# Patient Record
Sex: MALE | Race: WHITE | Employment: FULL TIME | URBAN - NONMETROPOLITAN AREA
[De-identification: names, ages, dates, MRNs, and addresses within clinical notes are randomized per-mention and may not be internally consistent; named-entity substitution may affect disease eponyms.]

---

## 2017-12-18 ENCOUNTER — APPOINTMENT (OUTPATIENT)
Dept: GENERAL RADIOLOGY | Age: 62
DRG: 287 | End: 2017-12-18
Payer: COMMERCIAL

## 2017-12-18 ENCOUNTER — HOSPITAL ENCOUNTER (INPATIENT)
Age: 62
LOS: 1 days | Discharge: HOME | DRG: 287 | End: 2017-12-19
Attending: EMERGENCY MEDICINE | Admitting: INTERNAL MEDICINE
Payer: COMMERCIAL

## 2017-12-18 DIAGNOSIS — Z72.0 TOBACCO ABUSE: ICD-10-CM

## 2017-12-18 DIAGNOSIS — R07.9 CHEST PAIN, UNSPECIFIED TYPE: Primary | ICD-10-CM

## 2017-12-18 PROBLEM — R07.89 INTERMITTENT LEFT-SIDED CHEST PAIN: Status: ACTIVE | Noted: 2017-12-18

## 2017-12-18 PROBLEM — F17.210 DEPENDENCE ON NICOTINE FROM CIGARETTES: Status: ACTIVE | Noted: 2017-12-18

## 2017-12-18 LAB
ALBUMIN SERPL-MCNC: 4.1 G/DL (ref 3.5–5.1)
ALP BLD-CCNC: 78 U/L (ref 38–126)
ALT SERPL-CCNC: 28 U/L (ref 11–66)
ANION GAP SERPL CALCULATED.3IONS-SCNC: 11 MEQ/L (ref 8–16)
AST SERPL-CCNC: 25 U/L (ref 5–40)
BASOPHILS # BLD: 0.4 %
BASOPHILS ABSOLUTE: 0 THOU/MM3 (ref 0–0.1)
BILIRUB SERPL-MCNC: 0.3 MG/DL (ref 0.3–1.2)
BILIRUBIN DIRECT: < 0.2 MG/DL (ref 0–0.3)
BUN BLDV-MCNC: 17 MG/DL (ref 7–22)
CALCIUM SERPL-MCNC: 9.1 MG/DL (ref 8.5–10.5)
CHLORIDE BLD-SCNC: 103 MEQ/L (ref 98–111)
CO2: 27 MEQ/L (ref 23–33)
CREAT SERPL-MCNC: 0.9 MG/DL (ref 0.4–1.2)
EKG ATRIAL RATE: 56 BPM
EKG P AXIS: 46 DEGREES
EKG P-R INTERVAL: 216 MS
EKG Q-T INTERVAL: 390 MS
EKG QRS DURATION: 84 MS
EKG QTC CALCULATION (BAZETT): 376 MS
EKG R AXIS: 80 DEGREES
EKG T AXIS: 79 DEGREES
EKG VENTRICULAR RATE: 56 BPM
EOSINOPHIL # BLD: 1.4 %
EOSINOPHILS ABSOLUTE: 0.1 THOU/MM3 (ref 0–0.4)
GFR SERPL CREATININE-BSD FRML MDRD: 85 ML/MIN/1.73M2
GLUCOSE BLD-MCNC: 67 MG/DL (ref 70–108)
HCT VFR BLD CALC: 47 % (ref 42–52)
HEMOGLOBIN: 15.6 GM/DL (ref 14–18)
LIPASE: 34.1 U/L (ref 5.6–51.3)
LYMPHOCYTES # BLD: 21.6 %
LYMPHOCYTES ABSOLUTE: 1.4 THOU/MM3 (ref 1–4.8)
MCH RBC QN AUTO: 30.1 PG (ref 27–31)
MCHC RBC AUTO-ENTMCNC: 33.3 GM/DL (ref 33–37)
MCV RBC AUTO: 90.3 FL (ref 80–94)
MONOCYTES # BLD: 9.6 %
MONOCYTES ABSOLUTE: 0.6 THOU/MM3 (ref 0.4–1.3)
NUCLEATED RED BLOOD CELLS: 0 /100 WBC
OSMOLALITY CALCULATION: 281.1 MOSMOL/KG (ref 275–300)
PDW BLD-RTO: 14.2 % (ref 11.5–14.5)
PLATELET # BLD: 210 THOU/MM3 (ref 130–400)
PMV BLD AUTO: 7.5 MCM (ref 7.4–10.4)
POTASSIUM SERPL-SCNC: 4.4 MEQ/L (ref 3.5–5.2)
PRO-BNP: 90.1 PG/ML (ref 0–900)
RBC # BLD: 5.2 MILL/MM3 (ref 4.7–6.1)
SEG NEUTROPHILS: 67 %
SEGMENTED NEUTROPHILS ABSOLUTE COUNT: 4.4 THOU/MM3 (ref 1.8–7.7)
SODIUM BLD-SCNC: 141 MEQ/L (ref 135–145)
TOTAL PROTEIN: 6.8 G/DL (ref 6.1–8)
TROPONIN T: < 0.01 NG/ML
WBC # BLD: 6.5 THOU/MM3 (ref 4.8–10.8)

## 2017-12-18 PROCEDURE — 80076 HEPATIC FUNCTION PANEL: CPT

## 2017-12-18 PROCEDURE — 99219 PR INITIAL OBSERVATION CARE/DAY 50 MINUTES: CPT | Performed by: INTERNAL MEDICINE

## 2017-12-18 PROCEDURE — 93005 ELECTROCARDIOGRAM TRACING: CPT

## 2017-12-18 PROCEDURE — G0378 HOSPITAL OBSERVATION PER HR: HCPCS

## 2017-12-18 PROCEDURE — 6370000000 HC RX 637 (ALT 250 FOR IP): Performed by: EMERGENCY MEDICINE

## 2017-12-18 PROCEDURE — 2580000003 HC RX 258: Performed by: INTERNAL MEDICINE

## 2017-12-18 PROCEDURE — 99285 EMERGENCY DEPT VISIT HI MDM: CPT

## 2017-12-18 PROCEDURE — 85025 COMPLETE CBC W/AUTO DIFF WBC: CPT

## 2017-12-18 PROCEDURE — 36415 COLL VENOUS BLD VENIPUNCTURE: CPT

## 2017-12-18 PROCEDURE — 84484 ASSAY OF TROPONIN QUANT: CPT

## 2017-12-18 PROCEDURE — 1200000003 HC TELEMETRY R&B

## 2017-12-18 PROCEDURE — 83880 ASSAY OF NATRIURETIC PEPTIDE: CPT

## 2017-12-18 PROCEDURE — 80048 BASIC METABOLIC PNL TOTAL CA: CPT

## 2017-12-18 PROCEDURE — 83690 ASSAY OF LIPASE: CPT

## 2017-12-18 PROCEDURE — 71020 XR CHEST STANDARD TWO VW: CPT

## 2017-12-18 RX ORDER — ONDANSETRON 2 MG/ML
4 INJECTION INTRAMUSCULAR; INTRAVENOUS EVERY 6 HOURS PRN
Status: DISCONTINUED | OUTPATIENT
Start: 2017-12-18 | End: 2017-12-19

## 2017-12-18 RX ORDER — ASPIRIN 81 MG/1
324 TABLET, CHEWABLE ORAL ONCE
Status: COMPLETED | OUTPATIENT
Start: 2017-12-18 | End: 2017-12-18

## 2017-12-18 RX ORDER — SODIUM CHLORIDE 0.9 % (FLUSH) 0.9 %
10 SYRINGE (ML) INJECTION EVERY 12 HOURS SCHEDULED
Status: DISCONTINUED | OUTPATIENT
Start: 2017-12-18 | End: 2017-12-19

## 2017-12-18 RX ORDER — SODIUM CHLORIDE 0.9 % (FLUSH) 0.9 %
10 SYRINGE (ML) INJECTION PRN
Status: DISCONTINUED | OUTPATIENT
Start: 2017-12-18 | End: 2017-12-19

## 2017-12-18 RX ADMIN — ASPIRIN 81 MG 324 MG: 81 TABLET ORAL at 10:10

## 2017-12-18 RX ADMIN — Medication 10 ML: at 20:34

## 2017-12-18 RX ADMIN — NITROGLYCERIN 0.5 INCH: 20 OINTMENT TOPICAL at 10:10

## 2017-12-18 ASSESSMENT — ENCOUNTER SYMPTOMS
NAUSEA: 0
DIARRHEA: 0
EYE ITCHING: 0
EYE DISCHARGE: 0
RHINORRHEA: 0
WHEEZING: 0
ABDOMINAL PAIN: 0
SHORTNESS OF BREATH: 0
ABDOMINAL DISTENTION: 0
VOMITING: 0
COUGH: 0

## 2017-12-18 ASSESSMENT — PAIN DESCRIPTION - PROGRESSION: CLINICAL_PROGRESSION: NOT CHANGED

## 2017-12-18 ASSESSMENT — PAIN DESCRIPTION - PAIN TYPE: TYPE: ACUTE PAIN

## 2017-12-18 ASSESSMENT — PAIN DESCRIPTION - ORIENTATION: ORIENTATION: RIGHT

## 2017-12-18 ASSESSMENT — PAIN DESCRIPTION - LOCATION: LOCATION: CHEST

## 2017-12-18 ASSESSMENT — PAIN SCALES - GENERAL
PAINLEVEL_OUTOF10: 0
PAINLEVEL_OUTOF10: 1
PAINLEVEL_OUTOF10: 0

## 2017-12-18 ASSESSMENT — PAIN DESCRIPTION - DESCRIPTORS: DESCRIPTORS: DISCOMFORT;ACHING

## 2017-12-18 ASSESSMENT — PAIN DESCRIPTION - FREQUENCY: FREQUENCY: INTERMITTENT

## 2017-12-18 NOTE — CONSULTS
[facility]'s  Cardiology Consult      Patient:  Chavo Guerra  YOB: 1955    MRN: [id number]   Acct: [de-identified]     Primary Care Physician: Mitzi Fothergill, CNP        REASON FOR CONSULT: CP    CHIEF COMPLAINT:        HISTORY OF PRESENT ILLNESS:      All labs, EKG's, diagnostic testing and images as well as cardiac cath, stress testing   were reviewed during this encounter  Younger brother had stress test and CABG. 40 pk-yr smoking    PREVIOUS CARDIAC TESTING    Ekg:     Echo:    Str. Test:    Cath:      Past Medical History:    Past Medical History:   Diagnosis Date    C. difficile colitis        Past Surgical History:    Past Surgical History:   Procedure Laterality Date    COLONOSCOPY  2011, 2016    ELBOW SURGERY      TONSILLECTOMY         Medications Prior to Admission:    No prescriptions prior to admission. Allergies:    Review of patient's allergies indicates no known allergies. Social History:    reports that he has been smoking. He has a 21.50 pack-year smoking history. He has never used smokeless tobacco. He reports that he does not drink alcohol or use drugs. Family History:   family history is not on file. REVIEW OF SYSTEMS:    Constitutional: negative for anorexia, chills and fevers,weight change  Respiratory: negative for cough, dyspnea on exertion, hemoptysis, shortness of breath and wheezing  Cardiovascular: negative for chest pain, orthopnea, palpitations and syncope  Gastrointestinal: negative for abdominal pain,nausea , vomiting, constipation, diarrhea.   Hematologic/lymphatic: negative for bruising,prolonged bleeding,blood clots  Musculoskeletal:negative for muscle weakness, myalgias,wasting  Neurological: negative for coordination problems, dizziness, gait problems and vertigo  Behavioral/Psych:negative for mood/sleep disturbance      PHYSICAL EXAM:  Vitals:Patient Vitals for the past 24 hrs:   BP Temp Temp src Pulse Resp SpO2 12/18/17 1305 133/71 97.5 °F (36.4 °C) Oral 55 20 96 %   12/18/17 1246 128/89 - - 50 14 96 %   12/18/17 1121 133/72 - - 52 16 100 %   12/18/17 1009 (!) 153/76 - - 54 17 95 %   12/18/17 0920 134/67 98.2 °F (36.8 °C) Oral 57 16 98 %       Last 3 weights: Wt Readings from Last 3 Encounters:   04/25/16 185 lb 6.4 oz (84.1 kg)   03/08/16 190 lb (86.2 kg)   02/16/16 183 lb (83 kg)     24 hour intake/output:No intake or output data in the 24 hours ending 12/18/17 1326  BMI:There is no height or weight on file to calculate BMI. General Appearance: alert and oriented to person, place and time, well developed and well- nourished, in no acute distress  Skin: warm and dry, no rash or erythema  Eyes: pupils equal, round, and reactive to light, extraocular eye movements intact, conjunctivae normal  Neck: supple and non-tender without mass, no thyromegaly or thyroid nodules, no cervical lymphadenopathy  Pulmonary/Chest: clear to auscultation bilaterally- no wheezes, rales or rhonchi, normal air movement, no respiratory distress  Cardiovascular: normal rate, regular rhythm, normal S1 and S2, no murmurs, rubs, clicks, or gallops, distal pulses intact, no carotid bruits, Negative JVD  Radial Pulses: intact 2+  Abdomen: soft, non-tender, non-distended, normal bowel sounds, no masses or organomegaly  Extremities: no cyanosis, clubbing .  Edema  Musculoskeletal: normal range of motion, no joint swelling, deformity or tenderness    LABS:  Recent Labs      12/18/17   0925   TROPONINT  < 0.010     CBC: Lab Results   Component Value Date    WBC 6.5 12/18/2017    RBC 5.20 12/18/2017    HGB 15.6 12/18/2017    HCT 47.0 12/18/2017    MCV 90.3 12/18/2017    MCH 30.1 12/18/2017    MCHC 33.3 12/18/2017    RDW 14.2 12/18/2017     12/18/2017    MPV 7.5 12/18/2017     BMP:  Lab Results   Component Value Date     12/18/2017    K 4.4 12/18/2017     12/18/2017    CO2 27 12/18/2017    BUN 17 12/18/2017    LABALBU 4.1 12/18/2017 CREATININE 0.9 12/18/2017    CALCIUM 9.1 12/18/2017    LABGLOM 85 12/18/2017    GLUCOSE 67 12/18/2017     Hepatic Function Panel:  Lab Results   Component Value Date    ALKPHOS 78 12/18/2017    ALT 28 12/18/2017    AST 25 12/18/2017    PROT 6.8 12/18/2017    BILITOT 0.3 12/18/2017    BILIDIR <0.2 12/18/2017    LABALBU 4.1 12/18/2017         ASSESSMENT/PLAN:  CP resolved . Has RF and family history. Discussed cath ve stress test and SMOKING CESSATION. Patient will discuss testing with family. NPO after MN.       Clay Corbett MD FACMONTANA,FASE,FSVM,FSCAI,ISRAEL,GIANNI,FACP.  1:26 PM  12/18/2017

## 2017-12-18 NOTE — H&P
History & Physical        Patient:  Dianna Walters  YOB: 1955    MRN: [id number]     Acct: [de-identified]    PCP: Isatu Cabezas CNP    Date of Admission: 12/18/2017    Date of Service: Pt seen/examined on 12/18/17 and Admitted to Inpatient with expected LOS greater than two midnights due to medical therapy. Chief Complaint:  Recurrent chest pain. History Of Present Illness:      59 y.o. male who was brought to the emergency department by his wife with the three-day complaint of recurrent chest pain. The pains began last Saturday and patient describes a tightness of varying intensity focal mostly to the lateral aspect of his right pectoral muscles. Patient went to work this morning but had to quit and return home because of the pain. His wife subsequently made him come to the emergency department. This patient is a heavy smoker with a 40-50 pack year history. He underwent stress testing some 25 years ago and was told all was normal.  He also has a very strong paternal family history of coronary artery disease, however. In the emergency department all of the patient's labs, and initial troponin was negative. The chest pain had declined to 1/10 while in the emergency department after having received aspirin and Nitropaste. Patient was admitted to rule out acute coronary syndrome, but also to receive a full evaluation by cardiology. Past Medical History:          Diagnosis Date    C. difficile colitis        Past Surgical History:          Procedure Laterality Date    COLONOSCOPY  2011, 2016    ELBOW SURGERY      TONSILLECTOMY         Medications Prior to Admission:      Prior to Admission medications    Not on File       Allergies:  Review of patient's allergies indicates no known allergies. Social History:      The patient currently lives at home. TOBACCO:   reports that he has been smoking. He has a 21.50 pack-year smoking history.  He has never used smokeless tobacco.  ETOH:   reports that he does not drink alcohol. Family History:      Very significant family history of coronary artery disease. Diet:  Diet NPO, After Midnight    REVIEW OF SYSTEMS:   Pertinent positives as noted in the HPI. All other systems reviewed and negative. PHYSICAL EXAM:    /63   Pulse 56   Temp 97.7 °F (36.5 °C) (Oral)   Resp 18   Ht 6' 3\" (1.905 m)   Wt 178 lb 8 oz (81 kg)   SpO2 93%   BMI 22.31 kg/m²     General appearance:  No apparent distress, appears stated age and cooperative. HEENT:  Normal cephalic, atraumatic without obvious deformity. Pupils equal, round, and reactive to light. Extra ocular muscles intact. Conjunctivae/corneas clear. Neck: Supple, with full range of motion. No jugular venous distention. Trachea midline. Respiratory:  Normal respiratory effort. Clear to auscultation, bilaterally without Rales/Wheezes/Rhonchi. Cardiovascular:  Regular rate and rhythm with normal S1/S2 without murmurs, rubs or gallops. Abdomen: Soft, non-tender, non-distended with normal bowel sounds. Musculoskeletal:  No clubbing, cyanosis or edema bilaterally. Full range of motion without deformity. Patient's right lateral pectoral muscles are not tender to palpation, and the chest pain could not be elicited. Skin: Skin color, texture, turgor normal.  No rashes or lesions. Neurologic:  Neurovascularly intact without any focal sensory/motor deficits.  Cranial nerves: II-XII intact, grossly non-focal.  Psychiatric:  Alert and oriented, thought content appropriate, normal insight  Capillary Refill: Brisk,< 3 seconds   Peripheral Pulses: +2 palpable, equal bilaterally       Labs:     Recent Labs      12/18/17   0925   WBC  6.5   HGB  15.6   HCT  47.0   PLT  210     Recent Labs      12/18/17   0925   NA  141   K  4.4   CL  103   CO2  27   BUN  17   CREATININE  0.9   CALCIUM  9.1     Recent Labs      12/18/17   0925   AST  25   ALT  28   BILIDIR  <0.2   BILITOT  0.3 ALKPHOS  78     No results for input(s): INR in the last 72 hours. No results for input(s): [employer] in the last 72 hours. Urinalysis:    No results found for: [place], BACTERIA, RBCUA, BLOODU, SPECGRAV, [address]    Radiology:       XR CHEST STANDARD (2 VW)   Final Result   NO ACUTE CARDIOPULMONARY PROCESS. MINIMAL CHRONIC FINDINGS. **This report has been created using voice recognition software. It may contain minor errors which are inherent in voice recognition technology. **            Final report electronically signed by Dr. Bruce Burnham on 12/18/2017 10:16 AM               DVT prophylaxis: [] Lovenox                                 [x] SCDs                                 [] SQ Heparin                                 [] Encourage ambulation           [] Already on Anticoagulation    Code Status: Full Code        Disposition:    [] Home       [] TCU       [] Rehab       [] Psych       [] SNF       [] [place]       [] Other-    ASSESSMENT:    C/[address] Problems    Diagnosis Date Noted    Intermittent chest pain [R07.9] 12/18/2017    Dependence on nicotine from cigarettes [F17.210] 12/18/2017       PLAN:    Right sided chest pain which does not overtly appear cardiac in origin. Nevertheless, this patient has very significant family history. He is admitted for further evaluation by cardiology. Thank you Kathia Pruett CNP for the opportunity to be involved in this patient's care.     Electronically signed by Paz Gutierrez MD on 12/19/2017 at 3:52 AM

## 2017-12-18 NOTE — ED NOTES
Patient in ed room 17. Patient complain of right sided chest pain since Saturday. Patient in gown on monitor, vital signs obtained and assessment completed.      Christi Zelaya, CHERYL  [phone]

## 2017-12-18 NOTE — ED NOTES
[facility] to [facility] in stable condition. Patient monitored on cardiac telemetry.      Contact made with  prior to transport        JESS Velásquez-P  12/18/17 0192

## 2017-12-18 NOTE — ED PROVIDER NOTES
CARE:   None    CONSULTS:  Hospitalist    PROCEDURES:  None    FINAL IMPRESSION      1. Chest pain, unspecified type    2. Tobacco abuse          DISPOSITION/PLAN   Admit    PATIENT REFERRED TO:  No follow-up provider specified.     DISCHARGE MEDICATIONS:  New Prescriptions    No medications on file       (Please note that portions of this note were completed with a voice recognition program.  Efforts were made to edit the dictations but occasionally words are mis-transcribed.)    MD Daniela Roldan MD  12/18/17 9768

## 2017-12-19 ENCOUNTER — APPOINTMENT (OUTPATIENT)
Dept: CARDIAC CATH/INVASIVE PROCEDURES | Age: 62
DRG: 287 | End: 2017-12-19
Payer: COMMERCIAL

## 2017-12-19 VITALS
WEIGHT: 176.1 LBS | BODY MASS INDEX: 21.9 KG/M2 | HEART RATE: 55 BPM | HEIGHT: 75 IN | RESPIRATION RATE: 18 BRPM | SYSTOLIC BLOOD PRESSURE: 130 MMHG | DIASTOLIC BLOOD PRESSURE: 62 MMHG | TEMPERATURE: 98.5 F | OXYGEN SATURATION: 95 %

## 2017-12-19 PROBLEM — Z98.890 S/P CARDIAC CATH: Status: ACTIVE | Noted: 2017-12-19

## 2017-12-19 LAB
LDL CHOLESTEROL DIRECT: 126.48 MG/DL
LV EF: 55 %
LVEF MODALITY: NORMAL

## 2017-12-19 PROCEDURE — 83721 ASSAY OF BLOOD LIPOPROTEIN: CPT

## 2017-12-19 PROCEDURE — 2780000010 HC IMPLANT OTHER

## 2017-12-19 PROCEDURE — G0378 HOSPITAL OBSERVATION PER HR: HCPCS

## 2017-12-19 PROCEDURE — C1769 GUIDE WIRE: HCPCS

## 2017-12-19 PROCEDURE — 2580000003 HC RX 258: Performed by: INTERNAL MEDICINE

## 2017-12-19 PROCEDURE — 4A023N7 MEASUREMENT OF CARDIAC SAMPLING AND PRESSURE, LEFT HEART, PERCUTANEOUS APPROACH: ICD-10-PCS | Performed by: INTERNAL MEDICINE

## 2017-12-19 PROCEDURE — 6370000000 HC RX 637 (ALT 250 FOR IP): Performed by: INTERNAL MEDICINE

## 2017-12-19 PROCEDURE — B2111ZZ FLUOROSCOPY OF MULTIPLE CORONARY ARTERIES USING LOW OSMOLAR CONTRAST: ICD-10-PCS | Performed by: INTERNAL MEDICINE

## 2017-12-19 PROCEDURE — C1894 INTRO/SHEATH, NON-LASER: HCPCS

## 2017-12-19 PROCEDURE — 93458 L HRT ARTERY/VENTRICLE ANGIO: CPT | Performed by: INTERNAL MEDICINE

## 2017-12-19 PROCEDURE — 99217 PR OBSERVATION CARE DISCHARGE MANAGEMENT: CPT | Performed by: INTERNAL MEDICINE

## 2017-12-19 PROCEDURE — 6360000002 HC RX W HCPCS

## 2017-12-19 PROCEDURE — A6257 TRANSPARENT FILM <= 16 SQ IN: HCPCS

## 2017-12-19 PROCEDURE — 36415 COLL VENOUS BLD VENIPUNCTURE: CPT

## 2017-12-19 PROCEDURE — 2500000003 HC RX 250 WO HCPCS

## 2017-12-19 PROCEDURE — A6198 ALGINATE DRESSING > 48 SQ IN: HCPCS

## 2017-12-19 RX ORDER — ATORVASTATIN CALCIUM 40 MG/1
40 TABLET, FILM COATED ORAL NIGHTLY
Status: DISCONTINUED | OUTPATIENT
Start: 2017-12-19 | End: 2017-12-19 | Stop reason: HOSPADM

## 2017-12-19 RX ORDER — ATORVASTATIN CALCIUM 40 MG/1
40 TABLET, FILM COATED ORAL NIGHTLY
Qty: 30 TABLET | Refills: 0 | Status: SHIPPED | OUTPATIENT
Start: 2017-12-19

## 2017-12-19 RX ORDER — ONDANSETRON 2 MG/ML
4 INJECTION INTRAMUSCULAR; INTRAVENOUS EVERY 6 HOURS PRN
Status: DISCONTINUED | OUTPATIENT
Start: 2017-12-19 | End: 2017-12-19 | Stop reason: HOSPADM

## 2017-12-19 RX ORDER — ASPIRIN 81 MG/1
81 TABLET, CHEWABLE ORAL DAILY
Status: DISCONTINUED | OUTPATIENT
Start: 2017-12-19 | End: 2017-12-19 | Stop reason: HOSPADM

## 2017-12-19 RX ORDER — ASPIRIN 81 MG/1
81 TABLET, CHEWABLE ORAL DAILY
Qty: 30 TABLET | Refills: 0 | Status: SHIPPED | OUTPATIENT
Start: 2017-12-20

## 2017-12-19 RX ORDER — SODIUM CHLORIDE 9 MG/ML
INJECTION, SOLUTION INTRAVENOUS CONTINUOUS
Status: ACTIVE | OUTPATIENT
Start: 2017-12-19 | End: 2017-12-19

## 2017-12-19 RX ORDER — ACETAMINOPHEN 325 MG/1
650 TABLET ORAL EVERY 4 HOURS PRN
Status: DISCONTINUED | OUTPATIENT
Start: 2017-12-19 | End: 2017-12-19 | Stop reason: HOSPADM

## 2017-12-19 RX ORDER — SODIUM CHLORIDE 0.9 % (FLUSH) 0.9 %
10 SYRINGE (ML) INJECTION PRN
Status: DISCONTINUED | OUTPATIENT
Start: 2017-12-19 | End: 2017-12-19 | Stop reason: HOSPADM

## 2017-12-19 RX ORDER — SODIUM CHLORIDE 0.9 % (FLUSH) 0.9 %
10 SYRINGE (ML) INJECTION EVERY 12 HOURS SCHEDULED
Status: DISCONTINUED | OUTPATIENT
Start: 2017-12-19 | End: 2017-12-19 | Stop reason: HOSPADM

## 2017-12-19 RX ADMIN — ASPIRIN 81 MG: 81 TABLET, CHEWABLE ORAL at 13:05

## 2017-12-19 RX ADMIN — SODIUM CHLORIDE: 9 INJECTION, SOLUTION INTRAVENOUS at 11:38

## 2017-12-19 RX ADMIN — Medication 10 ML: at 09:24

## 2017-12-19 RX ADMIN — Medication 10 ML: at 13:07

## 2017-12-19 ASSESSMENT — PAIN SCALES - GENERAL: PAINLEVEL_OUTOF10: 0

## 2017-12-19 NOTE — CARE COORDINATION
12/19/17, 8:05 AM      Sonia Humphrey       Admitted from: ED 12/18/2017/ [facility] day: 1   Location: [facility]03/003-A Reason for admit: Intermittent chest pain [R07.9] Status: IP  Admit order signed?: yes  PMH:  has a past medical history of C. difficile colitis. Procedure: 12/19 Cardiac Cath   Pertinent abnormal Imaging:none  Medications:  Scheduled Meds:   sodium chloride flush  10 mL Intravenous 2 times per day    influenza virus vaccine  0.5 mL Intramuscular Once     Continuous Infusions:    Pertinent Info/Orders/Treatment Plan:  Cardiology following  Diet: Diet NPO, After Midnight   DVT Prophylaxis: Lovenox  Smoking status:  reports that he has been smoking. He has a 21.50 pack-year smoking history. He has never used smokeless tobacco.   Influenza Vaccination Screening Completed: yes, ordered  Pneumonia Vaccination Screening Completed: no, reviewed core measure, updated Sam Javier RN  Core measures: monitor  PCP: Misty Wright CNP  Readmission:   none  Risk Score: 2.5     Discharge Planning  Current Residence:  Private Residence  Living Arrangements:  Spouse/Significant Other   Support Systems:  Spouse/Significant Other, Family Members  Current Services PTA:     Potential Assistance Needed:  N/A  Potential Assistance Purchasing Medications:  No  Does patient want to participate in local refill/ meds to beds program?  No  Type of Home Care Services:  None  Patient expects to be discharged to:  home with wife  Expected Discharge date:  12/21/17  Follow Up Appointment: Best Day/ Time: Tuesday AM    Discharge Plan: plans home with spouse, off floor at time of rounds   Evaluation: no    12/20/17, 7:20 AM  Late Entry:  Discharge plan discussed by  and . Discharge plan reviewed with patient/ family. Patient/ family verbalize understanding of discharge plan and are in agreement with plan. Understanding was demonstrated using the teach back method.

## 2017-12-19 NOTE — PROGRESS NOTES
CLINICAL PHARMACY: DISCHARGE MED RECONCILIATION/REVIEW    [facility] ([facility] Select Patient?: No  Total # of Interventions Recommended: 0   -   Total # Interventions Accepted: 0  Intervention Severity:   - Level 1 Intervention Present?: No   - Level 2 #: 0   - Level 3 #: 0   Time Spent (min): 5    Additional Documentation:    Mary Connolly, PharmD, BCPS   12/19/2017  2:41 PM

## 2017-12-19 NOTE — DISCHARGE SUMMARY
Discharge Summary     Patient Identification:  Sandy Westbrook  :   MRN: [id number]   Account: [de-identified]     Admit date: 2017  Discharge date: 17   Attending provider: Simon Trinh DO        Primary care provider: Bry Goodson CNP     Discharge Diagnoses:   Principal Problem:    S/P cardiac cath: 2017: Non-obstructive CAD. LVEF 55%. Radial 80 cc. Active Problems:    Intermittent chest pain    Dependence on nicotine from cigarettes       Hospital Course:   Sandy Westbrook is a 59 y.o. male admitted to [address] [place] [address] on 2017 for chest pain. Patient had cardiac cath, nonobstructive CAD.   Patient notes some continued episodic pain and is agreeable with outpatient f/u with PCP to seek possible EGD (he is also established with GI - had colonoscopy recently)            Discharge Medications:   Thomas Arlen   Home Medication Instructions UQF:[id number]    Printed on:17 5134   Medication Information                      aspirin 81 MG chewable tablet  Take 1 tablet by mouth daily             atorvastatin (LIPITOR) 40 MG tablet  Take 1 tablet by mouth nightly                 Patient Instructions:    Discharge lab work: no  Activity: activity as tolerated  Diet: DIET GENERAL; No Added Salt (3-4 GM)    Code Status: Full Code    Follow-up visits:   Bry Goodson CNP  [address] [place] [address]     Go on 2017  Follow up with Primary Care Physician, Your appointment time is at 12:45PM, Please arrive 15 minutes prior to appointment time, Bring medications, photo ID, & insurance card    Drea Pike MD  [address]    Go on 2018  Your appointment time is at 2:30 pm, Cardiology, Please arrive 15 minutes prior to your appointment, Bring medications, photo ID, & insurance card       Procedures: Cardiac cath     Consults:   none    Examination:  Vitals:  Vitals:    17 1410 17 1510 17 1541 12/19/17 1705   BP: 119/62 132/63 126/61 130/62   Pulse: 54 62 54 55   Resp: 18 16 16 18   Temp:   98.5 °F (36.9 °C)    TempSrc:  Oral Oral    SpO2: 95% 93% 95% 95%   Weight:       Height:         Weight: Weight: 176 lb 1.6 oz (79.9 kg)     24 hour intake/output:  Intake/Output Summary (Last 24 hours) at 12/19/17 1724  Last data filed at 12/19/17 1329   Gross per 24 hour   Intake           502.22 ml   Output              950 ml   Net          -447.78 ml       General appearance - alert, well appearing, and in no distress, oriented to person, place, and time, normal appearing weight and acyanotic, in no respiratory distress  Chest - clear to auscultation, no wheezes, rales or rhonchi, symmetric air entry  Heart - normal rate, regular rhythm, normal S1, S2, no murmurs, rubs, clicks or gallops  Abdomen - soft, nontender, nondistended, no masses or organomegaly  Obese: No; Protuberant: No   Neurological - alert, oriented, normal speech, no focal findings or movement disorder noted  Extremities - peripheral pulses normal, no pedal edema, no clubbing or cyanosis  Skin - normal coloration and turgor, no rashes, no suspicious skin lesions noted    Significant Diagnostics:   Radiology: Xr Chest Standard (2 Vw)    Result Date: 12/18/2017  PROCEDURE: XR CHEST STANDARD TWO VW CLINICAL INFORMATION: chest pain, COMPARISON: No prior chest imaging for comparison. TECHNIQUE: Standard PA and lateral views of the chest provided, timed at 1007 hours. FINDINGS: Lung volumes are satisfactory. There is no focal alveolar consolidation of either lung to indicate significant atelectasis or possible alveolar pneumonia. The interstitium is nonspecific. There is no pulmonary vascular congestion. No focal abnormal mass other than minimal calcified granuloma. There is no pneumothorax or pleural effusion. Satisfactory appearance of the cardiac silhouette and mediastinum. No acute osseous abnormality. NO ACUTE CARDIOPULMONARY PROCESS. MINIMAL CHRONIC FINDINGS. **This report has been created using voice recognition software. It may contain minor errors which are inherent in voice recognition technology. ** Final report electronically signed by Dr. Robinson Rojas on 12/18/2017 10:16 AM      Labs:   Recent Results (from the past 72 hour(s))   EKG Chest Pain    Collection Time: 12/18/17  9:17 AM   Result Value Ref Range    Ventricular Rate 56 BPM    Atrial Rate 56 BPM    P-R Interval 216 ms    QRS Duration 84 ms    Q-T Interval 390 ms    QTc Calculation (Bazett) 376 ms    P Axis 46 degrees    R Axis 80 degrees    T Axis 79 degrees   CBC auto differential    Collection Time: 12/18/17  9:25 AM   Result Value Ref Range    WBC 6.5 4.8 - 10.8 thou/mm3    RBC 5.20 4.70 - 6.10 mill/mm3    Hemoglobin 15.6 14.0 - 18.0 gm/dl    Hematocrit 47.0 42.0 - 52.0 %    MCV 90.3 80.0 - 94.0 fL    MCH 30.1 27.0 - 31.0 pg    MCHC 33.3 33.0 - 37.0 gm/dl    RDW 14.2 11.5 - 14.5 %    Platelets 839 823 - 795 thou/mm3    MPV 7.5 7.4 - 10.4 mcm    Seg Neutrophils 67.0 %    Lymphocytes 21.6 %    Monocytes 9.6 %    Eosinophils 1.4 %    Basophils 0.4 %    nRBC 0 /100 wbc    Segs Absolute 4.4 1.8 - 7.7 thou/mm3    Lymphocytes # 1.4 1.0 - 4.8 thou/mm3    Monocytes # 0.6 0.4 - 1.3 thou/mm3    Eosinophils # 0.1 0.0 - 0.4 thou/mm3    Basophils # 0.0 0.0 - 0.1 thou/mm3   Basic Metabolic Panel    Collection Time: 12/18/17  9:25 AM   Result Value Ref Range    Sodium 141 135 - 145 meq/L    Potassium 4.4 3.5 - 5.2 meq/L    Chloride 103 98 - 111 meq/L    CO2 27 23 - 33 meq/L    Glucose 67 (L) 70 - 108 mg/dL    BUN 17 7 - 22 mg/dL    CREATININE 0.9 0.4 - 1.2 mg/dL    Calcium 9.1 8.5 - 10.5 mg/dL   Troponin    Collection Time: 12/18/17  9:25 AM   Result Value Ref Range    Troponin T < 0.010 ng/ml   Lipase    Collection Time: 12/18/17  9:25 AM   Result Value Ref Range    Lipase 34.1 5.6 - 51.3 U/L   Brain Natriuretic Peptide    Collection Time: 12/18/17  9:25 AM   Result Value Ref Range

## 2017-12-19 NOTE — H&P
[address] [address]  Sedation/Analgesia History & Physical    Pt Name: Donn Wray  MRN: [id number]  YOB: 1955  Provider Performing Procedure: Sharan Salas  Primary Care Physician: Dagmar Lepe CNP    PRE-PROCEDURE   DNR-CCA/DNR-CC []Yes [x]No  Brief History/Pre-Procedure Diagnosis: Angina at rest.     CONSENT  I have discussed with the patient and or the patient representative the indication, alternatives, and the possible risk and/ or complications of the planned procedure and the anesthesia or conscious sedation methods. The patient and or representative appear to understand and agree to proceed. I have discussed with the patient risks, benefits, and alternatives (and relevant risks, benefits, and side effects related to alternatives or not receiving care), and likelihood of the success. MEDICAL HISTORY        has a past medical history of C. difficile colitis. SURGICAL HISTORY   has a past surgical history that includes Tonsillectomy; Elbow surgery; and Colonoscopy (2011, 2016).   Additional information:       ALLERGIES   Allergies as of 12/18/2017    (No Known Allergies)     Additional information:       MEDICATIONS   Coumadin Use Last 7 Days [x]No []Yes  Antiplatelet drug therapy use last 7 days  []No [x]Yes  Other anticoagulant use last 7 days  [x]No []Yes      Current Facility-Administered Medications:     sodium chloride flush 0.9 % injection 10 mL, 10 mL, Intravenous, 2 times per day, Ivana Clinton MD, 10 mL at 12/19/17 0924    sodium chloride flush 0.9 % injection 10 mL, 10 mL, Intravenous, PRN, Ivana Clinton MD    magnesium hydroxide (MILK OF MAGNESIA) 400 MG/5ML suspension 30 mL, 30 mL, Oral, Daily PRN, Ivana Clinton MD    ondansetron [facility]) injection 4 mg, 4 mg, Intravenous, Q6H PRN, Ivana Clinton MD    influenza quadrivalent split vaccine (FLUZONE;FLUARIX;FLULAVAL;AFLURIA) injection 0.5 mL, 0.5 mL, death, were explained. The patient and family expressed understanding and agreed to proceed. Right radial approach.   Nathaniel Ambrose  Electronically signed 12/19/2017 at 9:58 AM

## 2017-12-19 NOTE — PROGRESS NOTES
Discharge teaching and instructions for diagnosis/procedure of chest pain completed with patient using teachback method. AVS reviewed. Printed prescriptions given to patient. Patient voiced understanding regarding prescriptions, follow up appointments, and care of self at home.  Discharged ambulatory to  independent living per family

## 2017-12-19 NOTE — PLAN OF CARE
level will decrease  Pain level will decrease   Outcome: Ongoing  Patient states that the discomfort in his chest is unchanged. Goal: Control of acute pain  Control of acute pain   Outcome: Completed Date Met: 12/18/17    Goal: Control of chronic pain  Control of chronic pain   Outcome: Completed Date Met: 12/18/17      Comments: Care plan reviewed with patient. Patient verbalizes understanding of the plan of care and contributes to goal setting.

## 2018-01-30 ENCOUNTER — OFFICE VISIT (OUTPATIENT)
Dept: CARDIOLOGY CLINIC | Age: 63
End: 2018-01-30
Payer: COMMERCIAL

## 2018-01-30 VITALS
HEART RATE: 60 BPM | DIASTOLIC BLOOD PRESSURE: 62 MMHG | WEIGHT: 191 LBS | SYSTOLIC BLOOD PRESSURE: 110 MMHG | HEIGHT: 75 IN | BODY MASS INDEX: 23.75 KG/M2

## 2018-01-30 DIAGNOSIS — Z98.890 S/P CARDIAC CATH: Primary | ICD-10-CM

## 2018-01-30 PROCEDURE — 99214 OFFICE O/P EST MOD 30 MIN: CPT | Performed by: INTERNAL MEDICINE

## 2022-07-30 ENCOUNTER — HOSPITAL ENCOUNTER (OUTPATIENT)
Dept: MRI IMAGING | Age: 67
Discharge: HOME | End: 2022-07-30
Payer: COMMERCIAL

## 2022-07-30 DIAGNOSIS — M54.16 LUMBAR RADICULOPATHY: ICD-10-CM

## 2022-07-30 DIAGNOSIS — M41.86 OTHER FORMS OF SCOLIOSIS, LUMBAR REGION: ICD-10-CM

## 2022-07-30 DIAGNOSIS — M51.26 RUPTURED LUMBAR INTERVERTEBRAL DISC: ICD-10-CM

## 2022-07-30 PROCEDURE — 72148 MRI LUMBAR SPINE W/O DYE: CPT

## 2025-06-12 ENCOUNTER — TRANSCRIBE ORDERS (OUTPATIENT)
Dept: ADMINISTRATIVE | Age: 70
End: 2025-06-12

## 2025-06-12 DIAGNOSIS — R97.20 ELEVATED PROSTATE SPECIFIC ANTIGEN (PSA): Primary | ICD-10-CM

## 2025-07-18 ENCOUNTER — HOSPITAL ENCOUNTER (OUTPATIENT)
Dept: MRI IMAGING | Age: 70
Discharge: HOME | End: 2025-07-18
Attending: UROLOGY
Payer: MEDICARE

## 2025-07-18 DIAGNOSIS — R97.20 ELEVATED PROSTATE SPECIFIC ANTIGEN (PSA): ICD-10-CM

## 2025-07-18 PROCEDURE — 6360000004 HC RX CONTRAST MEDICATION: Performed by: UROLOGY

## 2025-07-18 PROCEDURE — A9579 GAD-BASE MR CONTRAST NOS,1ML: HCPCS | Performed by: UROLOGY

## 2025-07-18 PROCEDURE — 72197 MRI PELVIS W/O & W/DYE: CPT

## 2025-07-18 RX ORDER — GADOTERIDOL 279.3 MG/ML
15 INJECTION INTRAVENOUS
Status: COMPLETED | OUTPATIENT
Start: 2025-07-18 | End: 2025-07-18

## 2025-07-18 RX ADMIN — GADOTERIDOL 15 ML: 279.3 INJECTION, SOLUTION INTRAVENOUS at 16:38
